# Patient Record
Sex: FEMALE | Race: WHITE | NOT HISPANIC OR LATINO | Employment: FULL TIME | URBAN - METROPOLITAN AREA
[De-identification: names, ages, dates, MRNs, and addresses within clinical notes are randomized per-mention and may not be internally consistent; named-entity substitution may affect disease eponyms.]

---

## 2022-12-05 ENCOUNTER — APPOINTMENT (OUTPATIENT)
Dept: RADIOLOGY | Facility: CLINIC | Age: 42
End: 2022-12-05

## 2022-12-05 ENCOUNTER — HOSPITAL ENCOUNTER (EMERGENCY)
Facility: HOSPITAL | Age: 42
Discharge: HOME | End: 2022-12-05
Attending: EMERGENCY MEDICINE

## 2022-12-05 ENCOUNTER — APPOINTMENT (EMERGENCY)
Dept: RADIOLOGY | Facility: HOSPITAL | Age: 42
End: 2022-12-05

## 2022-12-05 VITALS
OXYGEN SATURATION: 97 % | WEIGHT: 213 LBS | HEART RATE: 76 BPM | RESPIRATION RATE: 18 BRPM | DIASTOLIC BLOOD PRESSURE: 79 MMHG | SYSTOLIC BLOOD PRESSURE: 126 MMHG | TEMPERATURE: 98.5 F

## 2022-12-05 DIAGNOSIS — S93.491A SPRAIN OF ANTERIOR TALOFIBULAR LIGAMENT OF RIGHT ANKLE, INITIAL ENCOUNTER: ICD-10-CM

## 2022-12-05 DIAGNOSIS — M25.571 ACUTE RIGHT ANKLE PAIN: ICD-10-CM

## 2022-12-05 DIAGNOSIS — M25.511 ACUTE PAIN OF RIGHT SHOULDER: ICD-10-CM

## 2022-12-05 DIAGNOSIS — S49.91XA INJURY OF RIGHT SHOULDER, INITIAL ENCOUNTER: Primary | ICD-10-CM

## 2022-12-05 RX ORDER — ONDANSETRON 4 MG/1
4 TABLET, FILM COATED ORAL EVERY 6 HOURS
Qty: 12 TABLET | Refills: 0 | Status: SHIPPED | OUTPATIENT
Start: 2022-12-05

## 2022-12-05 RX ORDER — PANTOPRAZOLE SODIUM 40 MG/1
40 TABLET, DELAYED RELEASE ORAL DAILY
COMMUNITY
Start: 2022-07-24

## 2022-12-05 RX ORDER — IBUPROFEN 600 MG/1
600 TABLET ORAL EVERY 6 HOURS PRN
Qty: 40 TABLET | Refills: 0 | Status: SHIPPED | OUTPATIENT
Start: 2022-12-05 | End: 2022-12-15

## 2022-12-05 RX ORDER — ONDANSETRON 2 MG/ML
4 INJECTION INTRAMUSCULAR; INTRAVENOUS EVERY 6 HOURS PRN
Status: DISCONTINUED | OUTPATIENT
Start: 2022-12-05 | End: 2022-12-05 | Stop reason: HOSPADM

## 2022-12-05 RX ORDER — LEVOTHYROXINE SODIUM 0.1 MG/1
100 TABLET ORAL DAILY
COMMUNITY

## 2022-12-05 RX ORDER — SERTRALINE HYDROCHLORIDE 100 MG/1
1 TABLET, FILM COATED ORAL DAILY
COMMUNITY
Start: 2022-11-30

## 2022-12-05 RX ORDER — HYDROMORPHONE HCL/PF 1 MG/ML
1 SYRINGE (ML) INJECTION
Status: DISCONTINUED | OUTPATIENT
Start: 2022-12-05 | End: 2022-12-05 | Stop reason: HOSPADM

## 2022-12-05 RX ORDER — ACETAMINOPHEN 325 MG/1
650 TABLET ORAL EVERY 6 HOURS
Qty: 112 TABLET | Refills: 0 | Status: SHIPPED | OUTPATIENT
Start: 2022-12-05 | End: 2022-12-19

## 2022-12-05 RX ORDER — HYDROMORPHONE HCL/PF 1 MG/ML
1 SYRINGE (ML) INJECTION ONCE
Status: COMPLETED | OUTPATIENT
Start: 2022-12-05 | End: 2022-12-05

## 2022-12-05 RX ORDER — OXYCODONE HYDROCHLORIDE 5 MG/1
5 TABLET ORAL EVERY 4 HOURS PRN
Qty: 18 TABLET | Refills: 0 | Status: SHIPPED | OUTPATIENT
Start: 2022-12-05 | End: 2022-12-08

## 2022-12-05 RX ADMIN — SODIUM CHLORIDE, SODIUM LACTATE, POTASSIUM CHLORIDE, AND CALCIUM CHLORIDE 500 ML: .6; .31; .03; .02 INJECTION, SOLUTION INTRAVENOUS at 14:21

## 2022-12-05 RX ADMIN — HYDROMORPHONE HYDROCHLORIDE 1 MG: 1 INJECTION, SOLUTION INTRAMUSCULAR; INTRAVENOUS; SUBCUTANEOUS at 14:18

## 2022-12-05 RX ADMIN — ONDANSETRON 4 MG: 2 INJECTION INTRAMUSCULAR; INTRAVENOUS at 14:18

## 2022-12-05 RX ADMIN — HYDROMORPHONE HYDROCHLORIDE 1 MG: 1 INJECTION, SOLUTION INTRAMUSCULAR; INTRAVENOUS; SUBCUTANEOUS at 15:54

## 2022-12-05 NOTE — ED PROVIDER NOTES
History  Chief Complaint   Patient presents with   • Fall     Pt fell today at work, slipped on paper on floor  Pt c/o r shoulder/r ankle pain     Pt is a right hand dominant 35 yo F with PMH of GERD and  Hypothyroidism who presents via EMS from [facility] for evaluation and treatment of  Right shoulder and right ankle pain following a slip and fall at work  Pt states that she was at work, when she slipped on a piece of paper and fell into the copy machine  At that time she noted significant shoulder pain and ankle pain  She took tylenol without improvement in her symptoms, and decided to be evaluated at [facility]  Xray at [facility] demonstrates significant subluxation of the right shoulder, there is no Y view  Xray of ankle without evidence of dislocation or fracture  Pt states she has the sensation that the shoulder is "going in and out of the socket"  She also reports episodic tingling that is associated with the sensation of the shoulder being out of socket  She is not having the sensation of tingling and numbness at this time  She states that her shoulder pain is 10/10, worsens with movement, has radiation to the right side of her neck and back  Her ankle pain is mild at rest, but worsens with ambulation  Pt denies amnesia to event, loc, head strike, back or neck pain, abdominal pain or SOB  She does report an episode of n/v related to acute pain when her shoulder was turned for routine xray        History provided by:  Patient  Fall  Mechanism of injury: fall    Injury location:  Leg and shoulder/arm  Shoulder/arm injury location:  R shoulder  Leg injury location:  R ankle  Time since incident:  2 hours  Arrived directly from scene: no    Fall:     Fall occurred:  Standing    Impact surface:  Hard floor (machinery)    Point of impact:  Outstretched arms    Entrapped after fall: no    Suspicion of alcohol use: no    Suspicion of drug use: no    Prior to arrival data:     Bystander interventions:  None Patient ambulatory at scene: yes      Blood loss:  None    Orientation at scene:  Person, place and situation    Loss of consciousness: no      Amnesic to event: no      Airway interventions:  None  Associated symptoms: nausea, neck pain and vomiting    Associated symptoms: no abdominal pain, no back pain, no blindness, no chest pain, no difficulty breathing, no headaches, no hearing loss, no loss of consciousness and no seizures    Nausea:     Severity:  Severe    Onset quality:  Sudden    Duration:  1 hour    Timing:  Intermittent    Progression:  Resolved  Vomiting:     Quality:  Bilious material    Number of occurrences:  1    Severity:  Moderate    Duration:  1 hour    Timing:  Intermittent    Progression:  Resolved  Risk factors: no AICD, no anticoagulation therapy, no asthma, no beta blocker therapy, no CABG, no CAD, no CHF, no COPD, no diabetes, no dialysis, no hemophilia, no kidney disease, no pacemaker, no past MI, not pregnant and no steroid use        Prior to Admission Medications   Prescriptions Last Dose Informant Patient Reported? Taking?   levothyroxine 100 mcg tablet   Yes No   Sig: Take 100 mcg by mouth daily   pantoprazole (PROTONIX) 40 mg tablet   Yes Yes   Sig: Take 40 mg by mouth daily   sertraline (ZOLOFT) 100 mg tablet   Yes Yes   Sig: Take 1 tablet by mouth daily      Facility-Administered Medications: None       No past medical history on file  No past surgical history on file  No family history on file  I have reviewed and agree with the history as documented  No existing history information found  No existing history information found  Review of Systems   Constitutional: Negative for chills and fever  HENT: Negative for ear pain, hearing loss and sore throat  Eyes: Negative for blindness, pain and visual disturbance  Respiratory: Negative for cough and shortness of breath  Cardiovascular: Negative for chest pain and palpitations     Gastrointestinal: Positive for nausea and vomiting  Negative for abdominal pain  Endocrine: Negative  Genitourinary: Negative for dysuria and hematuria  Musculoskeletal: Positive for neck pain  Negative for arthralgias and back pain  Skin: Negative for color change and rash  Allergic/Immunologic: Negative  Neurological: Negative for seizures, loss of consciousness, syncope and headaches  Hematological: Negative  Psychiatric/Behavioral: Negative  All other systems reviewed and are negative  Physical Exam  Physical Exam  Vitals and nursing note reviewed  Constitutional:       General: She is not in acute distress  Appearance: Normal appearance  She is well-developed  She is not ill-appearing, toxic-appearing or diaphoretic  HENT:      Head: Normocephalic and atraumatic  Right Ear: Tympanic membrane, ear canal and external ear normal       Left Ear: Tympanic membrane, ear canal and external ear normal       Nose: Nose normal       Mouth/Throat:      Mouth: Mucous membranes are moist    Eyes:      General: No scleral icterus  Conjunctiva/sclera: Conjunctivae normal       Pupils: Pupils are equal, round, and reactive to light  Cardiovascular:      Rate and Rhythm: Normal rate and regular rhythm  Heart sounds: No murmur heard  Pulmonary:      Effort: Pulmonary effort is normal  No respiratory distress  Breath sounds: Normal breath sounds  Abdominal:      General: Abdomen is flat  Bowel sounds are normal       Palpations: Abdomen is soft  Tenderness: There is no abdominal tenderness  Musculoskeletal:         General: No swelling  Right shoulder: Deformity, tenderness and bony tenderness present  No crepitus  Decreased range of motion  Normal pulse  Left shoulder: Normal       Cervical back: Normal range of motion and neck supple  No rigidity  Right ankle: Tenderness present over the lateral malleolus        Right Achilles Tendon: Normal       Left ankle: Normal  Left Achilles Tendon: Normal    Skin:     General: Skin is warm and dry  Capillary Refill: Capillary refill takes less than 2 seconds  Neurological:      General: No focal deficit present  Mental Status: She is alert and oriented to person, place, and time  Cranial Nerves: No cranial nerve deficit  Sensory: No sensory deficit  Motor: No weakness  Psychiatric:         Mood and Affect: Mood normal          Behavior: Behavior normal          Thought Content: Thought content normal          Vital Signs  ED Triage Vitals   Temperature Pulse Respirations Blood Pressure SpO2   12/05/22 1400 12/05/22 1340 12/05/22 1340 12/05/22 1340 12/05/22 1340   98 5 °F (36 9 °C) 77 20 128/65 99 %      Temp Source Heart Rate Source Patient Position - Orthostatic VS BP Location FiO2 (%)   12/05/22 1400 12/05/22 1340 12/05/22 1340 12/05/22 1340 --   Oral Monitor Sitting Left arm       Pain Score       12/05/22 1554       7           Vitals:    12/05/22 1340 12/05/22 1400 12/05/22 1600   BP: 128/65 128/65 126/79   Pulse: 77 74 76   Patient Position - Orthostatic VS: Sitting Sitting          Visual Acuity      ED Medications  Medications   HYDROmorphone (DILAUDID) injection 1 mg (1 mg Intravenous Given 12/5/22 1418)   lactated ringers bolus 500 mL (0 mL Intravenous Stopped 12/5/22 1458)       Diagnostic Studies  Results Reviewed     None                 XR shoulder 1 vw right   Final Result by Olga Lidia Domingo MD (12/06 7275)      No significant subluxation in the axial plane  Workstation performed: [id number]         XR shoulder 2+ views RIGHT   Final Result by Olga Lidia Domingo MD (12/06 0959)      Minimal residual albeit improved inferomedial subluxation        Workstation performed: [id number]                    Procedures  Splint application    Date/Time: 12/5/2022 3:00 PM  Performed by: Laura Hernandez PA-C  Authorized by: Laura Hernandez PA-C   Universal Protocol:  Consent: Verbal consent obtained  Risks and benefits: risks, benefits and alternatives were discussed  Consent given by: patient  Time out: Immediately prior to procedure a "time out" was called to verify the correct patient, procedure, equipment, support staff and site/side marked as required  Patient identity confirmed: verbally with patient      Pre-procedure details:     Sensation:  Normal    Skin color:  Pink  Procedure details:     Laterality:  Right    Location:  Ankle    Ankle:  R ankle    Splint type: manufactured aircast     Supplies:  Elastic bandage  Post-procedure details:     Pain:  Improved    Sensation:  Normal    Skin color:  Pink    Patient tolerance of procedure:   Tolerated well, no immediate complications             ED Course                                             MDM  Number of Diagnoses or Management Options  Injury of right shoulder, initial encounter: new and requires workup  Sprain of anterior talofibular ligament of right ankle, initial encounter: new and requires workup  Diagnosis management comments: Pt with injury of right shoulder - likely tendon or rotator cuff injury  Clinically, shoulder is going in and out of socket with movement  Discussed with ortho - will follow up in office  Pt placed in sling and provided with pain medications  Pt educated on red flag signs that would necessitate return to ED    R ankle sprain  No fracture noted   Pt able to bear weight with support of aircast and walker  Pt ambulated in ed  Will f/u with ortho             Amount and/or Complexity of Data Reviewed  Clinical lab tests: reviewed  Tests in the radiology section of CPT®: reviewed and ordered  Tests in the medicine section of CPT®: reviewed  Decide to obtain previous medical records or to obtain history from someone other than the patient: yes  Review and summarize past medical records: yes  Independent visualization of images, tracings, or specimens: yes    Risk of Complications, Morbidity, and/or Mortality  Presenting problems: moderate  Diagnostic procedures: moderate  Management options: moderate    Patient Progress  Patient progress: stable      Disposition  Final diagnoses:   Injury of right shoulder, initial encounter   Sprain of anterior talofibular ligament of right ankle, initial encounter     Time reflects when diagnosis was documented in both MDM as applicable and the Disposition within this note     Time User Action Codes Description Comment    12/5/2022  4:59 PM Robert, Odin5 [facility] Injury of right shoulder, initial encounter     12/5/2022  4:59 PM Gi Franco Add [[id number]] Sprain of anterior talofibular ligament of right ankle, initial encounter       ED Disposition     ED Disposition   Discharge    Condition   Stable    Date/Time   Mon Dec 5, 2022  4:59 PM    Comment   Maurizio Commander discharge to home/self care                 Follow-up Information     Follow up With Specialties Details Why Contact Info Additional Information    Scout Loomis MD  Call  As needed [address] Emergency Department Emergency Medicine Go to  If symptoms worsen [address] [facility] [id number] [address] [address] Emergency Department, [address] Urszula Crowder MD Orthopedic Surgery Schedule an appointment as soon as possible for a visit   [address] [place] [address]  [phone]             Discharge Medication List as of 12/5/2022  5:04 PM      START taking these medications    Details   acetaminophen (TYLENOL) 325 mg tablet Take 2 tablets (650 mg total) by mouth every 6 (six) hours for 14 days, Starting Mon 12/5/2022, Until Mon 12/19/2022, Normal      ibuprofen (MOTRIN) 600 mg tablet Take 1 tablet (600 mg total) by mouth every 6 (six) hours as needed for moderate pain for up to 10 days, Starting Mon 12/5/2022, Until Thu 12/15/2022 at 2359, Normal      oxyCODONE (ROXICODONE) 5 immediate release tablet Take 1 tablet (5 mg total) by mouth every 4 (four) hours as needed for moderate pain for up to 3 days Max Daily Amount: 30 mg, Starting Mon 12/5/2022, Until Thu 12/8/2022 at 2359, Normal         CONTINUE these medications which have NOT CHANGED    Details   pantoprazole (PROTONIX) 40 mg tablet Take 40 mg by mouth daily, Starting Sun 7/24/2022, Historical Med      sertraline (ZOLOFT) 100 mg tablet Take 1 tablet by mouth daily, Starting Wed 11/30/2022, Historical Med      levothyroxine 100 mcg tablet Take 100 mcg by mouth daily, Historical Med                 PDMP Review     None          ED Provider  Electronically Signed by           Kelsi Cedeno PA-C  12/06/22 5370

## 2022-12-05 NOTE — Clinical Note
Evelyn Peres was seen and treated in our emergency department on 12/5/2022  No work until cleared by Family Doctor/Orthopedics        Diagnosis: shoulder dislocation, ankle sprain    Jose Elias Andrade    She may return on this date: If you have any questions or concerns, please don't hesitate to call        Zhang Palm PA-C    ______________________________           _______________          _______________  Hospital Representative                              Date                                Time

## 2022-12-06 ENCOUNTER — TELEPHONE (OUTPATIENT)
Dept: OBGYN CLINIC | Facility: CLINIC | Age: 42
End: 2022-12-06

## 2022-12-06 NOTE — TELEPHONE ENCOUNTER
Called to see who the adjustor was for this wc claim  Also need the fax # and phone # she gave me verified

## 2022-12-07 ENCOUNTER — TELEPHONE (OUTPATIENT)
Dept: OBGYN CLINIC | Facility: HOSPITAL | Age: 42
End: 2022-12-07

## 2022-12-07 NOTE — TELEPHONE ENCOUNTER
Caller: Elias Henson    Doctor:  Christopher    Reason for call: Gave the  name, phone & fax number    Call back#: n/a

## 2022-12-13 ENCOUNTER — TELEPHONE (OUTPATIENT)
Dept: OBGYN CLINIC | Facility: HOSPITAL | Age: 42
End: 2022-12-13

## 2022-12-13 ENCOUNTER — OFFICE VISIT (OUTPATIENT)
Dept: OBGYN CLINIC | Facility: CLINIC | Age: 42
End: 2022-12-13

## 2022-12-13 ENCOUNTER — APPOINTMENT (OUTPATIENT)
Dept: RADIOLOGY | Facility: CLINIC | Age: 42
End: 2022-12-13

## 2022-12-13 ENCOUNTER — HOSPITAL ENCOUNTER (OUTPATIENT)
Dept: RADIOLOGY | Facility: HOSPITAL | Age: 42
Discharge: HOME | End: 2022-12-13

## 2022-12-13 VITALS
BODY MASS INDEX: 39.2 KG/M2 | HEART RATE: 89 BPM | DIASTOLIC BLOOD PRESSURE: 70 MMHG | HEIGHT: 62 IN | WEIGHT: 213 LBS | SYSTOLIC BLOOD PRESSURE: 101 MMHG

## 2022-12-13 DIAGNOSIS — S49.91XA INJURY OF RIGHT SHOULDER, INITIAL ENCOUNTER: ICD-10-CM

## 2022-12-13 DIAGNOSIS — M25.311 SHOULDER INSTABILITY, RIGHT: Primary | ICD-10-CM

## 2022-12-13 DIAGNOSIS — M25.311 SHOULDER INSTABILITY, RIGHT: ICD-10-CM

## 2022-12-13 RX ORDER — LIRAGLUTIDE 6 MG/ML
INJECTION, SOLUTION SUBCUTANEOUS
COMMUNITY
Start: 2022-11-14

## 2022-12-13 RX ORDER — FLUTICASONE FUROATE AND VILANTEROL 100; 25 UG/1; UG/1
POWDER RESPIRATORY (INHALATION)
COMMUNITY
Start: 2022-07-07

## 2022-12-13 RX ORDER — PEN NEEDLE, DIABETIC 32GX 5/32"
NEEDLE, DISPOSABLE MISCELLANEOUS
COMMUNITY
Start: 2022-11-21

## 2022-12-13 RX ORDER — ALBUTEROL SULFATE 90 UG/1
2 AEROSOL, METERED RESPIRATORY (INHALATION) EVERY 4 HOURS PRN
COMMUNITY
Start: 2022-05-11 | End: 2023-05-11

## 2022-12-13 NOTE — TELEPHONE ENCOUNTER
Caller: Lilaine Chen with [facility]    Doctor:  Christopher    CB#: [phone]      What needs to be faxed: Office notes from today    ATTN to: Liliane Chen    Fax#: [phone]      Documents were successfully e-faxed

## 2022-12-13 NOTE — PROGRESS NOTES
Assessment/Plan:  1  Shoulder instability, right  MRI shoulder right wo contrast    CT upper extremity wo contrast right      2  Injury of right shoulder, initial encounter  Ambulatory Referral to Orthopedic Surgery    XR shoulder 2+ vw right    MRI shoulder right wo contrast    CT upper extremity wo contrast right        The patient does appear to have an unstable shoulder, however her examination is quite unusual today  She feels most stable and reduced with the shoulder flexed to 90 degrees and externally rotated, which is the typical position she should be most unstable  It is also unusual that I can not appreciate when the patient is subluxing on examination  I am ordering a STAT MRI and CT of the shoulder to assess the labrum and to also assess for defect of the glenoid  She will have these studies today and will FU in the office tomorrow to review these results  For now, she will remain in her sling  We may consider adding her on for surgical intervention on Thursday pending MRI and CT results  Subjective:   Lencho Alcocer is a 36 y o  female who presents today for evaluation of her right shoulder She sustained a fall at work on 12/5/22, at which time she reached for the [employer] machine to brace her fall and felt the shoulder dislocate at that time  She was able to reduce this on her own, but was experiencing recurrent feelings like the shoulder was "going in and out"  She did go to urgent care and had xrays of the shoulder which showed inferior subluxation of the shoulder  She was sent to the ED where initial xrays inferomedial subluxation of the shoulder, but normal axillary views  The patient notes whenever she gets the arm extended or overhead this seems to reduce  She had some tingling into the arm initially, but currently notes good sensation of the right upper extremity  She denies any history of any shoulder issues in the past, even as a child    Upon taking xrays today she notes that when we got her extended for an axillary view it did feel like her shoulder reduced  The patient has been wearing a sling, which helps some  Review of Systems   Constitutional: Negative  Negative for chills and fever  HENT: Negative  Negative for ear pain and sore throat  Eyes: Negative  Negative for pain and redness  Respiratory: Negative  Negative for shortness of breath and wheezing  Cardiovascular: Negative for chest pain and palpitations  Gastrointestinal: Negative  Negative for abdominal pain and blood in stool  Endocrine: Negative  Negative for polydipsia and polyuria  Genitourinary: Negative  Negative for difficulty urinating and dysuria  Musculoskeletal:        As noted in HPI   Skin: Negative  Negative for pallor and rash  Neurological: Negative  Negative for dizziness and numbness  Hematological: Negative  Negative for adenopathy  Does not bruise/bleed easily  Psychiatric/Behavioral: Negative  Negative for confusion and suicidal ideas           Past Medical History:   Diagnosis Date   • Acid reflux    • Anxiety    • Dang's esophagus    • Disease of thyroid gland    • Hypothyroidism        Past Surgical History:   Procedure Laterality Date   • KIDNEY SURGERY         Family History   Problem Relation Age of Onset   • No Known Problems Mother    • No Known Problems Father        Social History     Occupational History   • Not on file   Tobacco Use   • Smoking status: Never   • Smokeless tobacco: Never   Vaping Use   • Vaping Use: Never used   Substance and Sexual Activity   • Alcohol use: Not Currently   • Drug use: Never   • Sexual activity: Not on file         Current Outpatient Medications:   •  acetaminophen (TYLENOL) 325 mg tablet, Take 2 tablets (650 mg total) by mouth every 6 (six) hours for 14 days, Disp: 112 tablet, Rfl: 0  •  albuterol (PROVENTIL HFA,VENTOLIN HFA) 90 mcg/act inhaler, Inhale 2 puffs every 4 (four) hours as needed, Disp: , Rfl:   •  Droplet Pen Needles 32G X 4 MM MISC, use 1 PEN NEEDLE to inject MEDICATION subcutaneously once daily, Disp: , Rfl:   •  Fluticasone Furoate-Vilanterol 100-25 mcg/actuation inhaler, inhale 1 puff by mouth and INTO THE LUNGS once daily, Disp: , Rfl:   •  ibuprofen (MOTRIN) 600 mg tablet, Take 1 tablet (600 mg total) by mouth every 6 (six) hours as needed for moderate pain for up to 10 days, Disp: 40 tablet, Rfl: 0  •  levothyroxine 100 mcg tablet, Take 100 mcg by mouth daily, Disp: , Rfl:   •  ondansetron (ZOFRAN) 4 mg tablet, Take 1 tablet (4 mg total) by mouth every 6 (six) hours, Disp: 12 tablet, Rfl: 0  •  pantoprazole (PROTONIX) 40 mg tablet, Take 40 mg by mouth daily, Disp: , Rfl:   •  Saxenda injection, INJECT 0 6 MG SUBCUTANEOUSLY DAILY FOR 1 WEEK THEN INCREASE BY 0  (REFER TO PRESCRIPTION NOTES)  , Disp: , Rfl:   •  sertraline (ZOLOFT) 100 mg tablet, Take 1 tablet by mouth daily, Disp: , Rfl:     Allergies   Allergen Reactions   • Latex Hives and Rash   • Morphine Anaphylaxis   • Penicillins Other (See Comments)     UTI, fever and sores in mouth     • Silver Other (See Comments)       Objective:  Vitals:    12/13/22 1003   BP: 101/70   Pulse: 89       Right Shoulder Exam     Tenderness   Right shoulder tenderness location: diffuse tenderness anterior shoulder  Range of Motion   External rotation: 60   Right shoulder forward flexion: 130 passive forward flexion  Other   Erythema: absent  Sensation: normal  Pulse: present    Comments:  Negative anterior apprehension- patient feel shoulder is most stable in this position  Pain anteriorly with posterior apprehension  Patient notes feeling the should slide in and out during examination, though I can not appreciate on examination when this occurs  I can not appreciate an empty glenoid sign throughout examination  Physical Exam  Constitutional:       General: She is not in acute distress  Appearance: She is well-developed     HENT:      Head: Normocephalic and atraumatic  Eyes:      General: No scleral icterus  Conjunctiva/sclera: Conjunctivae normal    Neck:      Vascular: No JVD  Cardiovascular:      Rate and Rhythm: Normal rate  Pulmonary:      Effort: Pulmonary effort is normal  No respiratory distress  Skin:     General: Skin is warm  Neurological:      Mental Status: She is alert and oriented to person, place, and time  Coordination: Coordination normal          I have personally reviewed pertinent films in PACS and my interpretation is as follows:  Xrays right shoulder from today  Inferomedial subluxation of shoulder on AP and scapular Y view, but will located on axillary views( again patient felt the shoulder reduce when outstretched for axillary views      This document was created using speech voice recognition software  Grammatical errors, random word insertions, pronoun errors, and incomplete sentences are an occasional consequence of this system due to software limitations, ambient noise, and hardware issues  Any formal questions or concerns about content, text, or information contained within the body of this dictation should be directly addressed to the provider for clarification

## 2022-12-14 ENCOUNTER — OFFICE VISIT (OUTPATIENT)
Dept: OBGYN CLINIC | Facility: CLINIC | Age: 42
End: 2022-12-14

## 2022-12-14 VITALS — HEIGHT: 62 IN | BODY MASS INDEX: 39.2 KG/M2 | WEIGHT: 213 LBS

## 2022-12-14 DIAGNOSIS — M25.311 SHOULDER INSTABILITY, RIGHT: Primary | ICD-10-CM

## 2022-12-14 NOTE — PROGRESS NOTES
Assessment/Plan:  1  Shoulder instability, right  Ambulatory Referral to Orthopedic Surgery        Scribe Attestation    I,:  David Talbert am acting as a scribe while in the presence of the attending physician :       I,:  Tamy Cancino MD personally performed the services described in this documentation    as scribed in my presence :         I did review Gaby's MRI and CT scan with her in the office  Her images are normal with no evidence of labral tears or bony defects  I am unsure why she is experiencing instability and subluxation episodes since there is no evidence of a specific cause on her MRI or CT  She does have intact baseline sensation in the right upper extremity  I did not perform a full physical exam of her right shoulder because she is stabilized in the sling and I did not want to elicit further pain or instability episodes  I explained to her there is no surgery at this point as there is nothing apparent to fix  Instead, I do think she should receive a second opinion from another shoulder specialist to help ensure she is receiving the most appropriate treatment  She verbalized understanding and agreed to this treatment plan  She was provided with a referral to see Dr Gia Garcia, who is another shoulder specialist within [place] [id number]  Subjective:   Saritha Vivar is a 36 y o  female who presents for follow up evaluation of her right shoulder  This is a workers compensation case  She received a Stat MRI and CT questioning a labral tear and glenoid defect following a fall at work on 12/5/22  She states her shoulder is currently in place and she is immobilized in a sling  She denies any numbness or tingling  She states that her pain is worse when the shoulder becomes unstable and subluxes or dislocates  The shoulder was found to be located on the CT scan and the MRI done yesterday  Review of Systems   Constitutional: Negative for chills, fever and unexpected weight change     HENT: Negative for nosebleeds and sore throat  Eyes: Negative for pain, redness and visual disturbance  Respiratory: Negative for cough, shortness of breath and wheezing  Cardiovascular: Negative for chest pain, palpitations and leg swelling  Gastrointestinal: Negative for nausea and vomiting  Endocrine: Negative for polydipsia and polyuria  Genitourinary: Negative for dysuria and hematuria  Musculoskeletal: Positive for arthralgias and myalgias  Negative for neck pain  See HPI   Skin: Negative for rash and wound  Neurological: Negative for dizziness, numbness and headaches  Psychiatric/Behavioral: Negative for decreased concentration  The patient is not nervous/anxious            Past Medical History:   Diagnosis Date   • Acid reflux    • Anxiety    • Dang's esophagus    • Disease of thyroid gland    • Hypothyroidism        Past Surgical History:   Procedure Laterality Date   • KIDNEY SURGERY         Family History   Problem Relation Age of Onset   • No Known Problems Mother    • No Known Problems Father        Social History     Occupational History   • Not on file   Tobacco Use   • Smoking status: Never   • Smokeless tobacco: Never   Vaping Use   • Vaping Use: Never used   Substance and Sexual Activity   • Alcohol use: Not Currently   • Drug use: Never   • Sexual activity: Not on file         Current Outpatient Medications:   •  acetaminophen (TYLENOL) 325 mg tablet, Take 2 tablets (650 mg total) by mouth every 6 (six) hours for 14 days, Disp: 112 tablet, Rfl: 0  •  albuterol (PROVENTIL HFA,VENTOLIN HFA) 90 mcg/act inhaler, Inhale 2 puffs every 4 (four) hours as needed, Disp: , Rfl:   •  Droplet Pen Needles 32G X 4 MM MISC, use 1 PEN NEEDLE to inject MEDICATION subcutaneously once daily, Disp: , Rfl:   •  Fluticasone Furoate-Vilanterol 100-25 mcg/actuation inhaler, inhale 1 puff by mouth and INTO THE LUNGS once daily, Disp: , Rfl:   •  ibuprofen (MOTRIN) 600 mg tablet, Take 1 tablet (600 mg total) by mouth every 6 (six) hours as needed for moderate pain for up to 10 days, Disp: 40 tablet, Rfl: 0  •  levothyroxine 100 mcg tablet, Take 100 mcg by mouth daily, Disp: , Rfl:   •  ondansetron (ZOFRAN) 4 mg tablet, Take 1 tablet (4 mg total) by mouth every 6 (six) hours, Disp: 12 tablet, Rfl: 0  •  pantoprazole (PROTONIX) 40 mg tablet, Take 40 mg by mouth daily, Disp: , Rfl:   •  Saxenda injection, INJECT 0 6 MG SUBCUTANEOUSLY DAILY FOR 1 WEEK THEN INCREASE BY 0  (REFER TO PRESCRIPTION NOTES)  , Disp: , Rfl:   •  sertraline (ZOLOFT) 100 mg tablet, Take 1 tablet by mouth daily, Disp: , Rfl:     Allergies   Allergen Reactions   • Latex Hives and Rash   • Morphine Anaphylaxis   • Penicillins Other (See Comments)     UTI, fever and sores in mouth     • Silver Other (See Comments)       Objective:  Vitals:       Right Shoulder Exam     Other   Sensation: normal  Pulse: present (2+ radial pulse)    Comments:  5/5 EPL, FPL, APB, first dorsal interosseous  Baseline sensation is intact in the musculocutaneous, median, ulnar, and radial nerve distributions  Physical Exam  Vitals reviewed  HENT:      Head: Normocephalic and atraumatic  Eyes:      General:         Right eye: No discharge  Left eye: No discharge  Conjunctiva/sclera: Conjunctivae normal       Pupils: Pupils are equal, round, and reactive to light  Cardiovascular:      Rate and Rhythm: Normal rate  Pulses: Normal pulses  Musculoskeletal:      Cervical back: Normal range of motion and neck supple  Comments: See HPI   Skin:     General: Skin is warm  Neurological:      Mental Status: She is alert           I have personally reviewed pertinent films in PACS and my interpretation is as follows:  MRI and CT of the right shoulder obtained 12/13/22 demonstrates intact rotator cuff with tendinosis, intact biceps long head tendon, intact labrum with just some mild degenerative change superiorly, and no bony defects such as Bankart or Hill-Sachs lesions  This document was created using speech voice recognition software  Grammatical errors, random word insertions, pronoun errors, and incomplete sentences are an occasional consequence of this system due to software limitations, ambient noise, and hardware issues  Any formal questions or concerns about content, text, or information contained within the body of this dictation should be directly addressed to the provider for clarification

## 2022-12-14 NOTE — LETTER
December 14, 2022     Patient: Chrissie Lacey  YOB: 1980  Date of Visit: 12/14/2022      To Whom it May Concern:    Chrissie Lacey is under my professional care  Rose Marie Dietz was seen in my office on 12/14/2022  Rose Marie Dietz is restricted from duty at work until further notice  If you have any questions or concerns, please don't hesitate to call           Sincerely,          Suri Rivera MD        CC: No Recipients

## 2022-12-15 ENCOUNTER — TELEPHONE (OUTPATIENT)
Dept: OBGYN CLINIC | Facility: CLINIC | Age: 42
End: 2022-12-15

## 2023-03-30 ENCOUNTER — APPOINTMENT (OUTPATIENT)
Dept: LAB | Facility: CLINIC | Age: 43
End: 2023-03-30

## 2023-03-30 DIAGNOSIS — Z01.818 OTHER SPECIFIED PRE-OPERATIVE EXAMINATION: ICD-10-CM

## 2023-03-30 LAB
ALBUMIN SERPL BCP-MCNC: 3.7 G/DL (ref 3.5–5)
ALP SERPL-CCNC: 49 U/L (ref 46–116)
ALT SERPL W P-5'-P-CCNC: 15 U/L (ref 12–78)
ANION GAP SERPL CALCULATED.3IONS-SCNC: 4 MMOL/L (ref 4–13)
AST SERPL W P-5'-P-CCNC: 7 U/L (ref 5–45)
BILIRUB SERPL-MCNC: 0.3 MG/DL (ref 0.2–1)
BUN SERPL-MCNC: 12 MG/DL (ref 5–25)
CALCIUM SERPL-MCNC: 9 MG/DL (ref 8.3–10.1)
CHLORIDE SERPL-SCNC: 107 MMOL/L (ref 96–108)
CO2 SERPL-SCNC: 24 MMOL/L (ref 21–32)
CREAT SERPL-MCNC: 0.85 MG/DL (ref 0.6–1.3)
GFR SERPL CREATININE-BSD FRML MDRD: 84 ML/MIN/1.73SQ M
GLUCOSE SERPL-MCNC: 107 MG/DL (ref 65–140)
POTASSIUM SERPL-SCNC: 3.7 MMOL/L (ref 3.5–5.3)
PROT SERPL-MCNC: 7.1 G/DL (ref 6.4–8.4)
SODIUM SERPL-SCNC: 135 MMOL/L (ref 135–147)

## 2023-10-03 ENCOUNTER — APPOINTMENT (OUTPATIENT)
Dept: RADIOLOGY | Facility: CLINIC | Age: 43
End: 2023-10-03
Payer: COMMERCIAL

## 2023-10-03 DIAGNOSIS — J44.1 CHRONIC OBSTRUCTIVE PULMONARY DISEASE WITH (ACUTE) EXACERBATION (HCC): ICD-10-CM

## 2023-10-03 PROCEDURE — 71046 X-RAY EXAM CHEST 2 VIEWS: CPT

## 2024-10-08 ENCOUNTER — HOSPITAL ENCOUNTER (EMERGENCY)
Facility: HOSPITAL | Age: 44
Discharge: HOME | End: 2024-10-08
Attending: EMERGENCY MEDICINE
Payer: COMMERCIAL

## 2024-10-08 ENCOUNTER — APPOINTMENT (EMERGENCY)
Dept: RADIOLOGY | Facility: HOSPITAL | Age: 44
End: 2024-10-08
Payer: COMMERCIAL

## 2024-10-08 ENCOUNTER — APPOINTMENT (EMERGENCY)
Dept: CT IMAGING | Facility: HOSPITAL | Age: 44
End: 2024-10-08
Payer: COMMERCIAL

## 2024-10-08 ENCOUNTER — OFFICE VISIT (OUTPATIENT)
Dept: URGENT CARE | Facility: CLINIC | Age: 44
End: 2024-10-08
Payer: COMMERCIAL

## 2024-10-08 VITALS
BODY MASS INDEX: 34.23 KG/M2 | HEART RATE: 82 BPM | HEIGHT: 62 IN | DIASTOLIC BLOOD PRESSURE: 62 MMHG | SYSTOLIC BLOOD PRESSURE: 108 MMHG | WEIGHT: 186 LBS | OXYGEN SATURATION: 98 % | RESPIRATION RATE: 18 BRPM | TEMPERATURE: 97.1 F

## 2024-10-08 VITALS
WEIGHT: 196.43 LBS | RESPIRATION RATE: 16 BRPM | SYSTOLIC BLOOD PRESSURE: 106 MMHG | BODY MASS INDEX: 35.93 KG/M2 | OXYGEN SATURATION: 95 % | TEMPERATURE: 98.3 F | DIASTOLIC BLOOD PRESSURE: 58 MMHG | HEART RATE: 79 BPM
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
## 2024-10-08 DIAGNOSIS — M54.2 MUSCULOSKELETAL NECK PAIN: ICD-10-CM

## 2024-10-08 DIAGNOSIS — S09.90XA TRAUMATIC INJURY OF HEAD, INITIAL ENCOUNTER: Primary | ICD-10-CM

## 2024-10-08 DIAGNOSIS — M94.0 COSTOCHONDRITIS: ICD-10-CM

## 2024-10-08 DIAGNOSIS — R42 DIZZINESS: ICD-10-CM

## 2024-10-08 LAB
ALBUMIN SERPL BCG-MCNC: 4 G/DL (ref 3.5–5)
ALP SERPL-CCNC: 42 U/L (ref 34–104)
ALT SERPL W P-5'-P-CCNC: 6 U/L (ref 7–52)
ANION GAP SERPL CALCULATED.3IONS-SCNC: 4 MMOL/L (ref 4–13)
AST SERPL W P-5'-P-CCNC: 8 U/L (ref 13–39)
ATRIAL RATE: 61 BPM
BASOPHILS # BLD AUTO: 0.05 THOUSANDS/ΜL (ref 0–0.1)
BASOPHILS NFR BLD AUTO: 1 % (ref 0–1)
BILIRUB SERPL-MCNC: 0.51 MG/DL (ref 0.2–1)
BUN SERPL-MCNC: 6 MG/DL (ref 5–25)
CALCIUM SERPL-MCNC: 8.7 MG/DL (ref 8.4–10.2)
CARDIAC TROPONIN I PNL SERPL HS: <2 NG/L
CHLORIDE SERPL-SCNC: 105 MMOL/L (ref 96–108)
CO2 SERPL-SCNC: 28 MMOL/L (ref 21–32)
CREAT SERPL-MCNC: 0.86 MG/DL (ref 0.6–1.3)
EOSINOPHIL # BLD AUTO: 0.34 THOUSAND/ΜL (ref 0–0.61)
EOSINOPHIL NFR BLD AUTO: 5 % (ref 0–6)
ERYTHROCYTE [DISTWIDTH] IN BLOOD BY AUTOMATED COUNT: 12.4 % (ref 11.6–15.1)
GFR SERPL CREATININE-BSD FRML MDRD: 81 ML/MIN/1.73SQ M
GLUCOSE SERPL-MCNC: 99 MG/DL (ref 65–140)
HCT VFR BLD AUTO: 39.3 % (ref 34.8–46.1)
HGB BLD-MCNC: 13.3 G/DL (ref 11.5–15.4)
HOLD SPECIMEN: NORMAL
IMM GRANULOCYTES # BLD AUTO: 0.02 THOUSAND/UL (ref 0–0.2)
IMM GRANULOCYTES NFR BLD AUTO: 0 % (ref 0–2)
LYMPHOCYTES # BLD AUTO: 1.77 THOUSANDS/ΜL (ref 0.6–4.47)
LYMPHOCYTES NFR BLD AUTO: 26 % (ref 14–44)
MCH RBC QN AUTO: 32 PG (ref 26.8–34.3)
MCHC RBC AUTO-ENTMCNC: 33.8 G/DL (ref 31.4–37.4)
MCV RBC AUTO: 95 FL (ref 82–98)
MONOCYTES # BLD AUTO: 0.32 THOUSAND/ΜL (ref 0.17–1.22)
MONOCYTES NFR BLD AUTO: 5 % (ref 4–12)
NEUTROPHILS # BLD AUTO: 4.22 THOUSANDS/ΜL (ref 1.85–7.62)
NEUTS SEG NFR BLD AUTO: 63 % (ref 43–75)
NRBC BLD AUTO-RTO: 0 /100 WBCS
P AXIS: 60 DEGREES
PLATELET # BLD AUTO: 226 THOUSANDS/UL (ref 149–390)
PMV BLD AUTO: 11.7 FL (ref 8.9–12.7)
POTASSIUM SERPL-SCNC: 3.9 MMOL/L (ref 3.5–5.3)
PR INTERVAL: 132 MS
PROT SERPL-MCNC: 6.6 G/DL (ref 6.4–8.4)
QRS AXIS: 70 DEGREES
QRSD INTERVAL: 80 MS
QT INTERVAL: 432 MS
QTC INTERVAL: 434 MS
RBC # BLD AUTO: 4.16 MILLION/UL (ref 3.81–5.12)
SODIUM SERPL-SCNC: 137 MMOL/L (ref 135–147)
T WAVE AXIS: 55 DEGREES
VENTRICULAR RATE: 61 BPM
WBC # BLD AUTO: 6.72 THOUSAND/UL (ref 4.31–10.16)

## 2024-10-08 PROCEDURE — 85025 COMPLETE CBC W/AUTO DIFF WBC: CPT | Performed by: EMERGENCY MEDICINE

## 2024-10-08 PROCEDURE — 36415 COLL VENOUS BLD VENIPUNCTURE: CPT | Performed by: EMERGENCY MEDICINE

## 2024-10-08 PROCEDURE — 74177 CT ABD & PELVIS W/CONTRAST: CPT

## 2024-10-08 PROCEDURE — 71260 CT THORAX DX C+: CPT

## 2024-10-08 PROCEDURE — 96374 THER/PROPH/DIAG INJ IV PUSH: CPT

## 2024-10-08 PROCEDURE — 73552 X-RAY EXAM OF FEMUR 2/>: CPT

## 2024-10-08 PROCEDURE — 93010 ELECTROCARDIOGRAM REPORT: CPT | Performed by: STUDENT IN AN ORGANIZED HEALTH CARE EDUCATION/TRAINING PROGRAM

## 2024-10-08 PROCEDURE — 84484 ASSAY OF TROPONIN QUANT: CPT | Performed by: EMERGENCY MEDICINE

## 2024-10-08 PROCEDURE — 99213 OFFICE O/P EST LOW 20 MIN: CPT | Performed by: PHYSICIAN ASSISTANT

## 2024-10-08 PROCEDURE — 99285 EMERGENCY DEPT VISIT HI MDM: CPT

## 2024-10-08 PROCEDURE — 70498 CT ANGIOGRAPHY NECK: CPT

## 2024-10-08 PROCEDURE — 93005 ELECTROCARDIOGRAM TRACING: CPT

## 2024-10-08 PROCEDURE — 80053 COMPREHEN METABOLIC PANEL: CPT | Performed by: EMERGENCY MEDICINE

## 2024-10-08 PROCEDURE — 70496 CT ANGIOGRAPHY HEAD: CPT

## 2024-10-08 PROCEDURE — 99285 EMERGENCY DEPT VISIT HI MDM: CPT | Performed by: EMERGENCY MEDICINE

## 2024-10-08 PROCEDURE — 96375 TX/PRO/DX INJ NEW DRUG ADDON: CPT

## 2024-10-08 RX ORDER — METOCLOPRAMIDE 5 MG/1
5 TABLET ORAL
COMMUNITY
Start: 2024-06-26

## 2024-10-08 RX ORDER — FENTANYL CITRATE 50 UG/ML
50 INJECTION, SOLUTION INTRAMUSCULAR; INTRAVENOUS ONCE
Status: COMPLETED | OUTPATIENT
Start: 2024-10-08 | End: 2024-10-08

## 2024-10-08 RX ORDER — ONDANSETRON 2 MG/ML
4 INJECTION INTRAMUSCULAR; INTRAVENOUS ONCE
Status: COMPLETED | OUTPATIENT
Start: 2024-10-08 | End: 2024-10-08

## 2024-10-08 RX ORDER — IPRATROPIUM BROMIDE AND ALBUTEROL SULFATE 2.5; .5 MG/3ML; MG/3ML
3 SOLUTION RESPIRATORY (INHALATION)
COMMUNITY
Start: 2024-06-06

## 2024-10-08 RX ORDER — IPRATROPIUM BROMIDE AND ALBUTEROL SULFATE 2.5; .5 MG/3ML; MG/3ML
3 SOLUTION RESPIRATORY (INHALATION) ONCE
Status: DISCONTINUED | OUTPATIENT
Start: 2024-10-08 | End: 2024-10-08 | Stop reason: HOSPADM

## 2024-10-08 RX ORDER — KETOROLAC TROMETHAMINE 30 MG/ML
15 INJECTION, SOLUTION INTRAMUSCULAR; INTRAVENOUS ONCE
Status: COMPLETED | OUTPATIENT
Start: 2024-10-08 | End: 2024-10-08

## 2024-10-08 RX ORDER — ACETAMINOPHEN 10 MG/ML
1000 INJECTION, SOLUTION INTRAVENOUS ONCE
Status: COMPLETED | OUTPATIENT
Start: 2024-10-08 | End: 2024-10-08

## 2024-10-08 RX ORDER — ALBUTEROL SULFATE 90 UG/1
2 INHALANT RESPIRATORY (INHALATION) EVERY 4 HOURS PRN
COMMUNITY
Start: 2024-06-20

## 2024-10-08 RX ORDER — MONTELUKAST SODIUM 10 MG/1
10 TABLET ORAL DAILY
COMMUNITY
Start: 2024-07-08

## 2024-10-08 RX ADMIN — FENTANYL CITRATE 50 MCG: 50 INJECTION INTRAMUSCULAR; INTRAVENOUS at 13:22

## 2024-10-08 RX ADMIN — ACETAMINOPHEN 1000 MG: 10 INJECTION INTRAVENOUS at 13:30

## 2024-10-08 RX ADMIN — ONDANSETRON 4 MG: 2 INJECTION INTRAMUSCULAR; INTRAVENOUS at 13:22

## 2024-10-08 RX ADMIN — IOHEXOL 100 ML: 350 INJECTION, SOLUTION INTRAVENOUS at 14:14

## 2024-10-08 RX ADMIN — KETOROLAC TROMETHAMINE 15 MG: 30 INJECTION, SOLUTION INTRAMUSCULAR; INTRAVENOUS at 15:04

## 2024-10-08 NOTE — PROGRESS NOTES
"  [facility]        NAME: Gaby Villatoro is a 44 y.o. female  : 1980    MRN: [id number]  DATE: 2024  TIME: 9:58 AM    Assessment and Plan   Traumatic injury of head, initial encounter [S09.90XA]  1. Traumatic injury of head, initial encounter  Transfer to other facility      2. Dizziness  Transfer to other facility            Patient Instructions   Concerned due to head trauma and dizziness/confusion  Sent to ER for head CT  Pt drove herself and recommended transport by ambulance as she should not be driving with dizziness and head injury    Follow up with PCP in 3-5 days.  Proceed to  ER if symptoms worsen.    If tests have been performed at [facility], our office will contact you with results if changes need to be made to the care plan discussed with you at the visit.  [facility]    Chief Complaint     Chief Complaint   Patient presents with    Fall     The patient fell down the stairs. The patient states she fell face first. (RT side of the face. The patient has bruise RT. Arm and RT. Knee abrasion. The patient is concern RT eye \"feel like it wants to close on its own. The patient feeling nausea and \"cloudy.\" No OTC medication taken.         History of Present Illness       Gaby is a 44-year-old female who presents to the clinic due to falling down the stairs this morning.  She states she tripped and fell down a full flight of stairs forward on her face and her face hit every stair on the way down.  She also notes left forearm and knee bruising but denies any facial bruising.  Per patient she states she does not tend to bruise on her face.  She states the left side of her face is in pain and feels swollen and she states that she is unable to keep her left eye open.  She also is complaining of nausea, dizziness, and feeling woozy.  She denies any neck or back pain.  She denies any headache, difficulty moving her eye, chest pain, shortness of " breath.  She denies any loss of consciousness.  She denies any anticoagulant or aspirin use.  She is ambulating normally and denies any wounds or abrasions.        Review of Systems   Review of Systems   Constitutional: Negative.    HENT:  Positive for facial swelling.    Respiratory:  Negative for shortness of breath.    Cardiovascular:  Negative for chest pain.   Neurological:  Positive for dizziness and light-headedness. Negative for syncope, speech difficulty, weakness and headaches.         Current Medications       Current Outpatient Medications:     albuterol (PROVENTIL HFA,VENTOLIN HFA) 90 mcg/act inhaler, Inhale 2 puffs every 4 (four) hours as needed, Disp: , Rfl:     Droplet Pen Needles 32G X 4 MM MISC, use 1 PEN NEEDLE to inject MEDICATION subcutaneously once daily, Disp: , Rfl:     Fluticasone Furoate-Vilanterol 100-25 mcg/actuation inhaler, inhale 1 puff by mouth and INTO THE LUNGS once daily, Disp: , Rfl:     ipratropium-albuterol (DUO-NEB) 0.5-2.5 mg/3 mL nebulizer solution, Inhale 3 mL, Disp: , Rfl:     levothyroxine 100 mcg tablet, Take 100 mcg by mouth daily, Disp: , Rfl:     metoclopramide (REGLAN) 5 mg tablet, Take 5 mg by mouth, Disp: , Rfl:     montelukast (SINGULAIR) 10 mg tablet, Take 10 mg by mouth daily, Disp: , Rfl:     pantoprazole (PROTONIX) 40 mg tablet, Take 40 mg by mouth daily, Disp: , Rfl:     Saxenda injection, INJECT 0.6 MG SUBCUTANEOUSLY DAILY FOR 1 WEEK THEN INCREASE BY 0....  (REFER TO PRESCRIPTION NOTES)., Disp: , Rfl:     sertraline (ZOLOFT) 100 mg tablet, Take 1 tablet by mouth daily, Disp: , Rfl:     ibuprofen (MOTRIN) 600 mg tablet, Take 1 tablet (600 mg total) by mouth every 6 (six) hours as needed for moderate pain for up to 10 days, Disp: 40 tablet, Rfl: 0    ondansetron (ZOFRAN) 4 mg tablet, Take 1 tablet (4 mg total) by mouth every 6 (six) hours (Patient not taking: Reported on 10/8/2024), Disp: 12 tablet, Rfl: 0    Current Allergies     Allergies as of 10/08/2024 -  "Reviewed 10/08/2024   Allergen Reaction Noted    Latex Hives and Rash 02/23/2017    Morphine Anaphylaxis 02/23/2017    Hydrocodone-acetaminophen Bradycardia 06/16/2023    Penicillins Other (See Comments) 02/23/2017    Silver Other (See Comments) 10/21/2020            The following portions of the patient's history were reviewed and updated as appropriate: allergies, current medications, past family history, past medical history, past social history, past surgical history and problem list.     Past Medical History:   Diagnosis Date    Acid reflux     Anxiety     Dang's esophagus     Disease of thyroid gland     Hypothyroidism        Past Surgical History:   Procedure Laterality Date    KIDNEY SURGERY         Family History   Problem Relation Age of Onset    No Known Problems Mother     No Known Problems Father          Medications have been verified.        Objective   /62   Pulse 82   Temp (!) 97.1 °F (36.2 °C)   Resp 18   Ht 5' 2\" (1.575 m)   Wt 84.4 kg (186 lb)   LMP 09/21/2024   SpO2 98%   BMI 34.02 kg/m²   Patient's last menstrual period was 09/21/2024.       Physical Exam     Physical Exam  Vitals and nursing note reviewed.   Constitutional:       General: She is not in acute distress.     Appearance: Normal appearance. She is not ill-appearing, toxic-appearing or diaphoretic.   HENT:      Head: Normocephalic and atraumatic. No raccoon eyes, Lauren's sign, abrasion, contusion, right periorbital erythema, left periorbital erythema or laceration.      Jaw: No pain on movement.   Cardiovascular:      Rate and Rhythm: Normal rate and regular rhythm.      Heart sounds: Normal heart sounds.   Pulmonary:      Effort: Pulmonary effort is normal.      Breath sounds: Normal breath sounds.   Musculoskeletal:      Left shoulder: Normal.      Left elbow: Normal.      Left forearm: Normal.      Cervical back: Full passive range of motion without pain and normal range of motion. Signs of trauma present. No " pain with movement. Normal range of motion.      Thoracic back: Normal.      Lumbar back: Normal.   Neurological:      Mental Status: She is alert and oriented to person, place, and time.   Psychiatric:         Mood and Affect: Mood normal.         Behavior: Behavior normal.

## 2024-10-08 NOTE — ED PROCEDURE NOTE
Procedure  POC FAST US    Date/Time: 10/8/2024 11:36 AM    Performed by: Nydia Brito MD  Authorized by: Nydia Brito MD    Patient location:  ED  Other Assisting Provider: Yes (comment) (MS Ethan, MS Miladis)    Procedure details:     Exam Type:  Diagnostic    Indications comment:  Fall    Assess for:  Intra-abdominal fluid and pericardial effusion    Technique: FAST      Views obtained:  Heart - Pericardial sac, RUQ - Hill's Pouch, LUQ - Splenorenal space and Suprapubic - Pouch of Damian    Image quality: diagnostic      Image availability:  Images available in PACS  FAST Findings:     RUQ (Hepatorenal) free fluid: absent      LUQ (Splenorenal) free fluid: absent      Suprapubic free fluid: absent      Cardiac wall motion: identified      Pericardial effusion: absent    Interpretation:     Impressions: negative    Comments:      Negative FAST exam                   Nydia Brito MD  10/08/24 1137       Nydia Brito MD  10/08/24 1200

## 2024-10-08 NOTE — Clinical Note
Gaby Villatoro was seen and treated in our emergency department on 10/8/2024.    No restrictions            Diagnosis:     Gaby  may return to work on return date.    She may return on this date: 10/11/2024         If you have any questions or concerns, please don't hesitate to call.      Blaine Starr MD    ______________________________           _______________          _______________  Hospital Representative                              Date                                Time

## 2024-10-08 NOTE — ED ATTENDING ATTESTATION
"10/8/2024  IXiao MD, saw and evaluated the patient. I have discussed the patient with the resident/non-physician practitioner and agree with the resident's/non-physician practitioner's findings, Plan of Care, and MDM as documented in the resident's/non-physician practitioner's note, except where noted. All available labs and Radiology studies were reviewed.  I was present for key portions of any procedure(s) performed by the resident/non-physician practitioner and I was immediately available to provide assistance.       At this point I agree with the current assessment done in the Emergency Department.  I have conducted an independent evaluation of this patient a history and physical is as follows:    44-year-old female without significant past medical history presenting for evaluation after a fall.  Patient states this morning she was going down the stairs when she tripped over her dog and fell down the additional 8 stairs.  Patient is unsure if she lost consciousness but was able to get up and ambulate afterwards.  Patient went to work where her coworkers stated her right eyelid was \"drooping\" and patient was sent to urgent care, who sent her here for further evaluation.  Patient states that she was unable to open her right eye and then has been excessively tearing.  Patient endorses nausea but no vomiting or abdominal pain.  Endorses pain in the right side of her neck and head in addition to her anterior chest.  Denies paresthesias, focal weakness, shortness of breath, dysuria, hematuria.    Please see resident documentation for histories and review of systems.    Exam: Vital signs and nursing notes reviewed  General: Awake, alert, no acute distress  HEENT: Normocephalic, atraumatic, mucous membranes moist, PERRL, EOMI, no nystagmus, right eye lacrimation  Neck: Immobilized  Heart: Regular rate and rhythm  Lungs: Clear to auscultation bilaterally without wheezing, rales, rhonchi.  " Midsternal chest tenderness to palpation  Abdomen: Soft, nontender, nondistended, no rebound or guarding  Extremities: No swelling or deformity  Skin: Warm, dry, intact, no rash  Neuro: Cranial nerves III, IV, V, VI, VII, XI, XII intact.  Strength is symmetric bilaterally upper and lower extremities with intact sensation    ED Course  ED Course as of 10/08/24 1538   Tue Oct 08, 2024   1234 Comprehensive metabolic panel(!)  Grossly normal   1235 CBC and differential  Grossly normal   1430 CT chest abdomen pelvis w contrast  IMPRESSION:     No acute findings in the chest, abdomen or pelvis.     1431 X-ray femur per my interpretation no acute fx   1446 hs TnI 0hr: <2   1447 CTA head and neck with and without contrast  IMPRESSION:     CT Brain:  No acute intracranial abnormality. Sinus mucosal disease as described.     CT Angiography:  Unremarkable CTA neck and brain.     1505 CT recon only thoracolumbar  IMPRESSION:     No fracture or traumatic subluxation.       ED course/Medical Decision Makin-year-old female presenting for evaluation after a fall.  Differential diagnosis includes intracranial hemorrhage, cervical spine fracture, thoracic or lumbar spine fracture, sternal fracture, rib fracture, intra-abdominal injury thoracic blood injury, sternal contusion, cervical vascular injury.  Patient reported ptosis at her job but does not demonstrate that on my exam.  However, there would be concern for spinal cord or cervical spine injury.  Laboratory studies obtained in triage show grossly normal CBC and CMP.  EKG shows normal sinus rhythm without evidence of ischemia or malignant dysrhythmia, and troponin is less than 2.  Low suspicion for acute coronary syndrome or blunt cardiac injury.  Injury occurred several hours prior to arrival.  There is no evidence of acute traumatic injury on CTA of the head, neck, chest, abdomen, pelvis, or spine.  There is no evidence of vessel injury.  Patient treated supportively with  some improvement.  Cervical spine was cleared clinically at the bedside by the resident physician.  At this time, patient is appropriate for discharge home with outpatient follow-up.  Will follow-up with PCP as needed.  Return precautions and supportive measures discussed.  Patient is in agreement and understanding of these instructions.    Diagnosis: Fall, chest wall pain, neck pain, cervical strain  Disposition: Discharge

## 2024-10-08 NOTE — ED PROVIDER NOTES
Final diagnoses:   Fall, initial encounter   Costochondritis   Musculoskeletal neck pain     ED Disposition       ED Disposition   Discharge    Condition   Stable    Date/Time   Tue Oct 8, 2024  3:32 PM    Comment   Gaby Villatoro discharge to home/self care.                   Assessment & Plan       Medical Decision Making  Female patient who presented to the emergency department after a fall down several stairs with possible loss of consciousness.  On examination, patient was noted to have tenderness to palpation of the sternum, cervical spine, and right eyelid ptosis.  Patient was placed in a cervical spine collar prior to arrival.  Patient's labs showed no acute concerns as well as her imaging.  No acute findings in chest abdomen or pelvis as well as CTA head and neck as per radiology.  While in the emergency department, patient was given Zofran, Tylenol, Toradol, and fentanyl.  Following medication administration, patient was noted to have symptomatic improvement and was plan for discharge.  Given patient's vital sign stability and negative imaging, presentation suggestive of musculoskeletal pain along with costochondritis.  Patient's right eyelid ptosis improved while in the emergency department.  Plan for patient was to be discharged home which she was agreeable to plan.  Patient was advised to follow-up outpatient with her PCP as needed and take OTC medications for the pain as needed.  Also instructed return to the emergency department if her symptoms worsen.    Amount and/or Complexity of Data Reviewed  Labs: ordered. Decision-making details documented in ED Course.  Radiology: ordered and independent interpretation performed. Decision-making details documented in ED Course.    Risk  Prescription drug management.        ED Course as of 10/09/24 1344   Tue Oct 08, 2024   1435 CTA head and neck with and without contrast  CT Brain:  No acute intracranial abnormality. Sinus mucosal disease as described.     CT  Angiography:  Unremarkable CTA neck and brain.  As per radiology.   1435 CT chest abdomen pelvis w contrast  No acute findings in the chest, abdomen or pelvis.  As per radiology.   1445 hs TnI 0hr: <2   1515 CT recon only thoracolumbar    No fracture or traumatic subluxation.          Medications   acetaminophen (Ofirmev) injection 1,000 mg (0 mg Intravenous Stopped 10/8/24 1345)   ondansetron (ZOFRAN) injection 4 mg (4 mg Intravenous Given 10/8/24 1322)   fentaNYL injection 50 mcg (50 mcg Intravenous Given 10/8/24 1322)   iohexol (OMNIPAQUE) 350 MG/ML injection (MULTI-DOSE) 100 mL (100 mL Intravenous Given 10/8/24 1414)   ketorolac (TORADOL) injection 15 mg (15 mg Intravenous Given 10/8/24 1504)       ED Risk Strat Scores                                               History of Present Illness       Chief Complaint   Patient presents with    Fall     Pt tripped over dog and slid down the steps       Past Medical History:   Diagnosis Date    Acid reflux     Anxiety     Dang's esophagus     Disease of thyroid gland     Hypothyroidism       Past Surgical History:   Procedure Laterality Date    KIDNEY SURGERY        Family History   Problem Relation Age of Onset    No Known Problems Mother     No Known Problems Father       Social History     Tobacco Use    Smoking status: Never     Passive exposure: Never    Smokeless tobacco: Never   Vaping Use    Vaping status: Never Used   Substance Use Topics    Alcohol use: Not Currently    Drug use: Never      E-Cigarette/Vaping    E-Cigarette Use Never User       E-Cigarette/Vaping Substances      I have reviewed and agree with the history as documented.     44-year-old female with no significant PMH who presents to the emergency department after a fall at home.  Patient states that this morning around 6 AM she had fell down about 8 stairs after tripping over the dog.  There is questionable loss of consciousness and patient denies taking any antiplatelet/anticoagulation  therapy.  Patient was able to drive herself to work at which point her fellow colleagues noticed her right eye was drooping and sent her to the [facility] urgent care.  At the urgent care patient was transferred via ambulance to the emergency department.  She has been having pain in her neck as well as her head but denies any changes in vision.  Also having nausea since the fall.  Patient was able to ambulate after the fall but is having right sided hip pain.  No other acute concerns at this time. Denies chest pain, SOB, cough, abdominal pain, n/v/d, fever, chills, dizziness, lightheadedness, HA, dysuria, hematuria, hematochezia, or melena.             Fall  Associated symptoms: neck pain    Associated symptoms: no abdominal pain, no back pain, no chest pain, no headaches, no nausea, no seizures and no vomiting        Review of Systems   Constitutional:  Negative for appetite change, chills, diaphoresis, fatigue and fever.   HENT:  Negative for congestion, ear pain, postnasal drip, rhinorrhea, sore throat and trouble swallowing.    Eyes:  Negative for pain and visual disturbance.        Right eye drooping   Respiratory:  Negative for cough and shortness of breath.    Cardiovascular:  Negative for chest pain and palpitations.   Gastrointestinal:  Negative for abdominal pain, constipation, diarrhea, nausea and vomiting.   Genitourinary:  Negative for decreased urine volume, dysuria and hematuria.   Musculoskeletal:  Positive for neck pain. Negative for arthralgias and back pain.        Right hip/thigh pain   Skin:  Negative for color change and rash.   Neurological:  Negative for dizziness, seizures, syncope, weakness, light-headedness and headaches.   All other systems reviewed and are negative.          Objective       ED Triage Vitals   Temperature Pulse Blood Pressure Respirations SpO2 Patient Position - Orthostatic VS   10/08/24 1025 10/08/24 1027 10/08/24 1027 10/08/24 1027 10/08/24 1029 --   98.3 °F (36.8 °C)  73 117/67 16 98 %       Temp Source Heart Rate Source BP Location FiO2 (%) Pain Score    10/08/24 1025 -- -- -- 10/08/24 1322    Oral    8      Vitals      Date and Time Temp Pulse SpO2 Resp BP Pain Score FACES Pain Rating User   10/08/24 1530 -- 79 95 % -- 106/58 -- -- RLN   10/08/24 1515 -- 81 97 % -- -- -- -- RLN   10/08/24 1504 -- 68 97 % -- 110/57 9 -- RLN   10/08/24 1500 -- 67 99 % -- 110/57 -- -- RLN   10/08/24 1430 -- 65 96 % -- 110/55 -- -- RLN   10/08/24 1400 -- 60 97 % -- 102/54 -- -- RLN   10/08/24 1351 -- -- -- -- -- 4 -- AG   10/08/24 1345 -- 71 97 % 16 120/62 -- -- AG   10/08/24 1322 -- -- -- -- -- 8 -- RLN   10/08/24 1300 -- 71 99 % -- 113/61 -- -- RLN   10/08/24 1245 -- 62 95 % -- -- -- -- RLN   10/08/24 1230 -- 59 96 % -- 106/63 -- -- RLN   10/08/24 1200 -- 75 98 % -- 112/69 -- -- RLN   10/08/24 1130 -- 72 98 % -- 104/69 -- -- RLN   10/08/24 1100 -- 71 98 % 16 104/63 -- -- RLN   10/08/24 1029 -- 70 98 % -- 111/76 -- -- RLN   10/08/24 1027 -- 73 -- 16 117/67 -- -- RLN   10/08/24 1025 98.3 °F (36.8 °C) -- -- -- -- -- -- RLN            Physical Exam  Vitals and nursing note reviewed.   Constitutional:       General: She is not in acute distress.     Appearance: Normal appearance. She is normal weight.   HENT:      Head: Normocephalic and atraumatic.      Right Ear: External ear normal.      Left Ear: External ear normal.      Nose: Nose normal.      Mouth/Throat:      Pharynx: Oropharynx is clear.   Eyes:      Extraocular Movements: Extraocular movements intact.      Conjunctiva/sclera: Conjunctivae normal.      Pupils: Pupils are equal, round, and reactive to light.      Comments: Patient noted to have right eyelid ptosis however, is able to open her eye and keep it open when assisted.   Neck:      Comments: Midline cervical tenderness.  C-spine collar in place.  Cardiovascular:      Rate and Rhythm: Normal rate and regular rhythm.      Pulses: Normal pulses.      Heart sounds: Normal heart sounds.       Comments: RRR with +S1 and S2, no murmurs appreciated on exam. Peripheral pulses intact.    Pulmonary:      Effort: Pulmonary effort is normal. No respiratory distress.      Breath sounds: Normal breath sounds. No wheezing, rhonchi or rales.      Comments: Significant tenderness to palpation over the sternum.  CTABL with no abnormal lung sounds such as wheezes or rales appreciated on exam.     Chest:      Chest wall: Tenderness present.   Abdominal:      General: Abdomen is flat. Bowel sounds are normal. There is no distension.      Palpations: Abdomen is soft.      Tenderness: There is no abdominal tenderness.      Comments: Soft, non tender, normo-active bowel sounds. Without rigidity, guarding, or distension.     Musculoskeletal:         General: Tenderness present. Normal range of motion.      Cervical back: Normal range of motion. Tenderness present.      Comments: Significant tenderness to palpation of the right lateral thigh   Skin:     General: Skin is warm and dry.      Findings: No bruising or erythema.      Comments: No noted erythema or ecchymosis throughout examination of skin.   Neurological:      General: No focal deficit present.      Mental Status: She is alert and oriented to person, place, and time. Mental status is at baseline.      Comments: CN grossly intact on visualization. No focal neurologic deficits noted on exam.  5/5 strength in all extremities. Neurovascularly intact with normal sensation and motor function.             Results Reviewed       Procedure Component Value Units Date/Time    HS Troponin 0hr (reflex protocol) [[id number]]  (Normal) Collected: 10/08/24 1114    Lab Status: Final result Specimen: Blood from Arm, Left Updated: 10/08/24 1442     hs TnI 0hr <2 ng/L     Trauma tubes on hold [[id number]] Collected: 10/08/24 1114    Lab Status: Final result Specimen: Blood from Arm, Left Updated: 10/08/24 1301    Narrative:      The following orders were created for panel order  Trauma tubes on hold.  Procedure                               Abnormality         Status                     ---------                               -----------         ------                     Light Blue Top on hold[[id number]]                           Final result               Green / Black tube on hold[[id number]]                       Final result                 Please view results for these tests on the individual orders.    Comprehensive metabolic panel [[id number]]  (Abnormal) Collected: 10/08/24 1114    Lab Status: Final result Specimen: Blood from Arm, Left Updated: 10/08/24 1136     Sodium 137 mmol/L      Potassium 3.9 mmol/L      Chloride 105 mmol/L      CO2 28 mmol/L      ANION GAP 4 mmol/L      BUN 6 mg/dL      Creatinine 0.86 mg/dL      Glucose 99 mg/dL      Calcium 8.7 mg/dL      AST 8 U/L      ALT 6 U/L      Alkaline Phosphatase 42 U/L      Total Protein 6.6 g/dL      Albumin 4.0 g/dL      Total Bilirubin 0.51 mg/dL      eGFR 81 ml/min/1.73sq m     Narrative:      National Kid[employer] Disease Foundation guidelines for Chronic Kidney Disease (CKD):     Stage 1 with normal or high GFR (GFR > 90 mL/min/1.73 square meters)    Stage 2 Mild CKD (GFR = 60-89 mL/min/1.73 square meters)    Stage 3A Moderate CKD (GFR = 45-59 mL/min/1.73 square meters)    Stage 3B Moderate CKD (GFR = 30-44 mL/min/1.73 square meters)    Stage 4 Severe CKD (GFR = 15-29 mL/min/1.73 square meters)    Stage 5 End Stage CKD (GFR <15 mL/min/1.73 square meters)  Note: GFR calculation is accurate only with a steady state creatinine    CBC and differential [[id number]] Collected: 10/08/24 1114    Lab Status: Final result Specimen: Blood from Arm, Left Updated: 10/08/24 1122     WBC 6.72 Thousand/uL      RBC 4.16 Million/uL      Hemoglobin 13.3 g/dL      Hematocrit 39.3 %      MCV 95 fL      MCH 32.0 pg      MCHC 33.8 g/dL      RDW 12.4 %      MPV 11.7 fL      Platelets 226 Thousands/uL      nRBC 0 /100 WBCs      Segmented % 63 %       Immature Grans % 0 %      Lymphocytes % 26 %      Monocytes % 5 %      Eosinophils Relative 5 %      Basophils Relative 1 %      Absolute Neutrophils 4.22 Thousands/µL      Absolute Immature Grans 0.02 Thousand/uL      Absolute Lymphocytes 1.77 Thousands/µL      Absolute Monocytes 0.32 Thousand/µL      Eosinophils Absolute 0.34 Thousand/µL      Basophils Absolute 0.05 Thousands/µL             CT chest abdomen pelvis w contrast   Final Interpretation by Joseluis Fischer MD (10/08 1425)      No acute findings in the chest, abdomen or pelvis.               Workstation performed: [id number]         CT recon only thoracolumbar   Final Interpretation by Joseluis Fischer MD (10/08 1502)      No fracture or traumatic subluxation.               Workstation performed: [id number]         CTA head and neck with and without contrast   Final Interpretation by Joseluis Fischer MD (10/08 1430)      CT Brain:  No acute intracranial abnormality. Sinus mucosal disease as described.      CT Angiography:  Unremarkable CTA neck and brain.                  Workstation performed: [id number]         XR femur 2 views RIGHT   ED Interpretation by Blaine Starr MD (10/08 1436)   Image was independently interpreted by myself and showed no acute concerns or fractures at this time.        Final Interpretation by Ventura Perea MD (10/08 8904)      No acute osseous abnormality.         Computerized Assisted Algorithm (CAA) may have been used to analyze all applicable images.         Workstation performed: [id number]             ECG 12 Lead Documentation Only    Date/Time: 10/8/2024 3:00 PM    Performed by: Blaine Starr MD  Authorized by: Blaine Starr MD    ECG reviewed by me, the ED Provider: yes    Patient location:  ED  Previous ECG:     Previous ECG:  Compared to current    Similarity:  No change  Interpretation:     Interpretation: normal    Rate:     ECG rate:  61    ECG rate assessment: normal    Rhythm:     Rhythm: sinus  rhythm    Ectopy:     Ectopy: none    QRS:     QRS axis:  Normal    QRS intervals:  Normal  Conduction:     Conduction: normal    ST segments:     ST segments:  Normal  T waves:     T waves: normal        ED Medication and Procedure Management   Prior to Admission Medications   Prescriptions Last Dose Informant Patient Reported? Taking?   Droplet Pen Needles 32G X 4 MM MISC   Yes No   Sig: use 1 PEN NEEDLE to inject MEDICATION subcutaneously once daily   Fluticasone Furoate-Vilanterol 100-25 mcg/actuation inhaler   Yes No   Sig: inhale 1 puff by mouth and INTO THE LUNGS once daily   Saxenda injection   Yes No   Sig: INJECT 0.6 MG SUBCUTANEOUSLY DAILY FOR 1 WEEK THEN INCREASE BY 0....  (REFER TO PRESCRIPTION NOTES).   albuterol (PROVENTIL HFA,VENTOLIN HFA) 90 mcg/act inhaler   Yes No   Sig: Inhale 2 puffs every 4 (four) hours as needed   ibuprofen (MOTRIN) 600 mg tablet   No No   Sig: Take 1 tablet (600 mg total) by mouth every 6 (six) hours as needed for moderate pain for up to 10 days   ipratropium-albuterol (DUO-NEB) 0.5-2.5 mg/3 mL nebulizer solution   Yes No   Sig: Inhale 3 mL   levothyroxine 100 mcg tablet   Yes No   Sig: Take 100 mcg by mouth daily   metoclopramide (REGLAN) 5 mg tablet   Yes No   Sig: Take 5 mg by mouth   montelukast (SINGULAIR) 10 mg tablet   Yes No   Sig: Take 10 mg by mouth daily   ondansetron (ZOFRAN) 4 mg tablet   No No   Sig: Take 1 tablet (4 mg total) by mouth every 6 (six) hours   Patient not taking: Reported on 10/8/2024   pantoprazole (PROTONIX) 40 mg tablet   Yes No   Sig: Take 40 mg by mouth daily   sertraline (ZOLOFT) 100 mg tablet   Yes No   Sig: Take 1 tablet by mouth daily      Facility-Administered Medications: None     Discharge Medication List as of 10/8/2024  3:33 PM        CONTINUE these medications which have NOT CHANGED    Details   albuterol (PROVENTIL HFA,VENTOLIN HFA) 90 mcg/act inhaler Inhale 2 puffs every 4 (four) hours as needed, Starting Thu 6/20/2024, Historical  Med      Droplet Pen Needles 32G X 4 MM MISC use 1 PEN NEEDLE to inject MEDICATION subcutaneously once daily, Historical Med      Fluticasone Furoate-Vilanterol 100-25 mcg/actuation inhaler inhale 1 puff by mouth and INTO THE LUNGS once daily, Historical Med      ibuprofen (MOTRIN) 600 mg tablet Take 1 tablet (600 mg total) by mouth every 6 (six) hours as needed for moderate pain for up to 10 days, Starting Mon 12/5/2022, Until Thu 12/15/2022 at 2359, Normal      ipratropium-albuterol (DUO-NEB) 0.5-2.5 mg/3 mL nebulizer solution Inhale 3 mL, Starting Thu 6/6/2024, Historical Med      levothyroxine 100 mcg tablet Take 100 mcg by mouth daily, Historical Med      metoclopramide (REGLAN) 5 mg tablet Take 5 mg by mouth, Starting Wed 6/26/2024, Historical Med      montelukast (SINGULAIR) 10 mg tablet Take 10 mg by mouth daily, Starting Mon 7/8/2024, Historical Med      ondansetron (ZOFRAN) 4 mg tablet Take 1 tablet (4 mg total) by mouth every 6 (six) hours, Starting Mon 12/5/2022, Normal      pantoprazole (PROTONIX) 40 mg tablet Take 40 mg by mouth daily, Starting Sun 7/24/2022, Historical Med      Saxenda injection INJECT 0.6 MG SUBCUTANEOUSLY DAILY FOR 1 WEEK THEN INCREASE BY 0....  (REFER TO PRESCRIPTION NOTES)., Historical Med      sertraline (ZOLOFT) 100 mg tablet Take 1 tablet by mouth daily, Starting Wed 11/30/2022, Historical Med           No discharge procedures on file.  ED SEPSIS DOCUMENTATION   Time reflects when diagnosis was documented in both MDM as applicable and the Disposition within this note       Time User Action Codes Description Comment    10/8/2024  3:32 PM Blaine Starr [W19.XXXA] Fall, initial encounter     10/8/2024  3:32 PM Blaine Starr [M94.0] Costochondritis     10/8/2024  3:33 PM Blaine Starr [M54.2] Musculoskeletal neck pain                  Blaine Starr MD  10/09/24 8500